# Patient Record
Sex: MALE | Race: WHITE
[De-identification: names, ages, dates, MRNs, and addresses within clinical notes are randomized per-mention and may not be internally consistent; named-entity substitution may affect disease eponyms.]

---

## 2017-07-24 NOTE — PREOPHP
DATE OF ADMISSION:  2017

 

 

 

The patient is to have surgey with Dr. Fisher at [facility] on 2017.

 

 

 

REASON FOR CONSULTATION:  Consultation requested by Dr. Aron Fisher for medical evaluation and clearance of a 62-year-old

gentleman about to undergo surgery.

 

 

Thank you Dr. Fisher for allowing us to participate in care of

this patient.

 

 

 

HISTORY OF PRESENT ILLNESS:  Redd Redmond is a 62-year-old

gentleman, issues with his left hip had repair of a torn labrum

arthroscopically done in 2016, which helped for a while but has

been bothering him ever since, and currently is being admitted

for a total hip replacement on the left side.

 

 

 

PAST SURGICAL HISTORY:  Has included appendectomy, the above-

mentioned arthroscopic surgery, tonsil and adenoid surgery, a

skin graft, a lymph node biopsy, skin cancers removed, as well as

a varicocele surgery.  He has fractured his right hand.

 

 

 

PRIOR HOSPITALIZATIONS:  The patient has had no medical

hospitalizations.

 

 

 

MEDICATIONS:  Include the following:  Prilosec, Lodine, Flexeril,

and periodic pain pills.

 

 

 

ALLERGIES:  HE IS ALLERGIC OR SENSITIVE TO MORPHINE.

 

 

 

SOCIAL HISTORY:  The patient is , has 3 daughters and 2

grandchildren.  He does not smoke.  He drinks alcohol socially.

He does drink coffee.  He has no difficulty sleeping at night.

Currently he is not working.

 

 

 

FAMILY HISTORY:  Father is alive at 86.  He has heart issues and

failure.  Mother  at age 67 of breast cancer and its

complications, she also had some heart issues.  Two siblings are

in good health and alive and well.  There is a family history of

diabetes, heart cancer, hypertension and stroke.  No thyroid

issues to his knowledge.

 

 

 

REVIEW OF SYSTEMS:

 

HEENT:  Has periodic tension headaches.

 

CARDIORESPIRATORY:  Denies any significant chest pain; however,

does get some dyspepsia.  No palpitations or orthopnea.

 

GASTROINTESTINAL:  No melena or hematemesis; however, signs and

symptoms of hyperacidity.

 

GENITOURINARY:  No urgency or frequency.

 

MUSCULOSKELETAL:  Positive for left hip pain.

 

NEUROPSYCHIATRIC:  Unremarkable.

 

GENERAL HEALTH:  As above.

 

 

 

PHYSICAL EXAMINATION:

 

VITAL SIGNS:  Blood pressure 120/80, pulse 86 and regular,

respirations 17, temperature 97.9, height 5 feet 6 inches, weight

174 pounds.

 

GENERAL APPEARANCE:  The patient was noted to be a well-developed

and well-nourished male, alert and cooperative in no apparent

acute distress, oriented to time, place and person.

 

HEENT:  Head was atraumatic.  The eyes, pupils were equal and

reactive to light and accommodation.  Fundi were benign.

Tympanic membranes were unremarkable.  Nose was negative.  Mouth

was unremarkable.  Fair oral hygiene was present.

 

NECK:  Was supple without any rigidity.  Trachea was midline.

Thyroid was unremarkable.  Neck veins were flat.  Carotid pulses

were equal.  No bruits were heard.

 

BACK:  Back exam was unremarkable.

 

CARDIAC:  Chest was symmetrical.

 

BREAST:  Breast and axillary exam did not reveal any masses.

 

LUNGS:  Clear to percussion and auscultation.

 

HEART:  Examination of the heart PMI was at the fifth intercostal

space at the midclavicular line.  A regular sinus rhythm was

noted.  No significant murmurs, rubs or gallops being elicited.

 

ABDOMEN:  Soft.  Good bowel sounds were noted.  No significant

organomegaly, masses or tenderness.  Scar from prior surgery was

noted.

 

GENITALIA:  Normal male external genitalia.

 

RECTAL:  Rectal and prostatic exam per PCP.

 

EXTREMITIES:  Did not reveal any clubbing, edema or cyanosis.

Peripheral pulses were physiologic.

 

SKIN:  Moist and warm without any eruptions.  No gross

lymphadenopathy was noted.

 

NEUROLOGIC:  Grossly intact.

 

 

 

DIAGNOSTICS:  Review of laboratory and other data revealed the

following:  The patient's chemistry panel including electrolytes,

BUN and creatinine were normal.  Liver function tests were

basically normal.  Random glucose was 126.  CBC, UA, PT and PTT

were normal.  The patient's EKG was normal as was his chest x-

ray, and bladder residual was around 50 mL post void.

 

 

 

IMPRESSION:

 

1.   Degenerative joint disease left hip.

2.   Gastroesophageal reflux disease.

3.   History of skin cancers.

4.   Stable health.

 

 

DISCUSSION:  Dr. Fisher, I see no contraindications to the

patient undergoing current proposed surgery under desired form of

anesthesia and feel he is a suitable candidate at this particular

point in time, and I will be more than happy to follow him with

you during his stay at [facility].

 

 

Thank you again Dr. Fisher for allowing us to participate in

care of this patient.

 

 

 

 

 

 

 

Dictated By:  Morgan Rueda MD

 

 

 

/arianna/tony

 

Job#:  [id number]/Document#:  [id number]

 

D:  2017 14:14

 

T:  2017 01:36

## 2017-07-27 NOTE — OPR
Date/Time of Note


Date/Time of Note


DATE: 7/27/17 


TIME: 09:12





Operative Report


Procedure Date:  Jul 27, 2017


Preoperative Diagnosis


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


Postoperative Diagnosis


Left hip status post hip arthroscopy with posttraumatic arthritis


Operation Performed


Left total hip arthroplasty after hip arthroscopy


Surgeon:  LAMBERTO GALLOWAY MD


Assistant:  SHANNON SOLANO MD


Anesthesia:  general


Estimated Blood Loss:  200 - 250 ml's


Pt Condition Post Procedure:  stable


Disposition:  PACU


Procedure Description


ASSISTANT SURGEON: Shannon Solano MD was asked to be present at my request as a 

result of the complexity associated with this procedure including positioning 

of the extremity, positioning of the instrumentation and protection of the 

neurovascular structures.  In my opinion, the assistance offered by a surgical 

scrub tech is insufficient and Dr. Solano should be compensated for his time.





PROCEDURE IN DETAIL: Following the administration of general endotracheal 

anesthesia supplemented with a spinal anesthetic, the patient was placed in the 

supine position.  The bilateral lower extremities were then prepped and draped 

in the usual sterile fashion.  A  radiograph was obtained for preliminary 

limb length and femoral size as well as acetabular size.





A lateral incision was then made exposing the tensor fascia the fascia was 

incised the tensor was retracted laterally and the vessels were cauterized.  

Significant scar tissue was encountered over the anterior capsule secondary to 

the prior hip arthroscopic procedure.  A capsulectomy was then performed and 

the femoral head was then evaluated severe arthritic changes were noted.





A femoral head cut was then made in the appropriate degree of version and 

inclination.





The acetabulum was then exposed capsulectomy and labral ectomy were completed.  

The central portion was then entered and serially reamed up to the 51 mm size.  

A Depuy [place] cup which is 52 mm in size with a standard liner was then fit 

into position with solid fixation.  A 30 mm screw was used for additional 

fixation.





Attention was then directed to the femur, the femur was exposed and prepared.  

The canal was entered and serially reamed up to the 11 mm size.  A 11 mm Depuy 

[place] stem was then inserted with solid fixation.  A standard femoral head 

which was ceramic was then inserted.





The leg was taken through full range of motion with no evident instability.  In 

addition, radiographs revealed excellent position with reproduction of the limb 

lengths within a millimeter.





The wound was irrigated thoroughly.  The wound was then closed in layers and a 

Prenio for the final cover.  This was watertight.  Estimated blood loss was 

procedure was 250 cc.  Postoperative radiographs will be obtained in the 

recovery room.











LAMBERTO GALLOWAY MD Jul 27, 2017 09:14

## 2017-07-27 NOTE — RADRPT
PROCEDURE:   XR Pelvis. 

 

CLINICAL INDICATION:  Postop

 

TECHNIQUE:   Single AP view of the pelvis. 

 

COMPARISON:   No prior studies are available for comparison. 

 

FINDINGS:

 

The patient is status post total left hip arthroplasty.  Alignment is anatomic.  Hardware appears in
tact. The remainder of the bones of the pelvis are intact.  There is subcutaneous gas overlying the 
left hip consistent with recent arthroplasty

 

IMPRESSION:

 

1.  Status post total left hip arthroplasty in anatomic alignment.

 

Of a text Medical

_____________________________________________ 

.Jose Alfredo Hernandez MD, MD           Date    Time 

Electronically viewed and signed by .Jose Alfredo Hernandez MD, MD on 07/27/2017 10:19 

 

D:  07/27/2017 10:19  T:  07/27/2017 10:19

.B/

## 2017-07-27 NOTE — HPN
Date/Time of Note


Date/Time of Note


DATE: 7/27/17 


TIME: 06:46





Interval H&P Admission Note


Pt. seen H&P reviewed:  No system changes











LAMBERTO GALLOWAY MD Jul 27, 2017 06:46

## 2017-07-27 NOTE — CONS
Date/Time of Note


Date/Time of Note


DATE: 7/27/17 


TIME: 12:49





Consult Date/Type/Reason


Admit Date/Time


Jul 27, 2017 at 05:20


Initial Consult Date


07/21/2017


Type of Consultation:  internal medicine


Reason for Consultation


preoperative medical evaluation and clearance


Ordering Provider:  LAMBERTO GALLOWAY MD





Subjective


seen in recovery awake recognizes me  no particular complaints





Objective





 Vital Signs








  Date Time  Temp Pulse Resp B/P Pulse Ox O2 Delivery O2 Flow Rate FiO2


 


7/27/17 11:22 97.7 47 20 135/61 99   


 


7/27/17 10:51      Nasal Cannula 2.0 








Exam


heent-negative


lungs clear 


heart6  rsr





Results/Medications


Result Diagram:  


7/27/17 1011





Results 24 hrs





Laboratory Tests








Test


  7/27/17


10:11


 


White Blood Count 12.5  H


 


Red Blood Count 3.54  L


 


Hemoglobin 11.6  L


 


Hematocrit 33.5  L


 


Mean Corpuscular Volume 94.6  


 


Mean Corpuscular Hemoglobin 32.8  


 


Mean Corpuscular Hemoglobin


Concent 34.6  


 


 


Red Cell Distribution Width 12.1  


 


Platelet Count 261  


 


Mean Platelet Volume 9.3  


 


Neutrophils % 89.1  H


 


Lymphocytes % 6.7  L


 


Monocytes % 2.8  


 


Eosinophils % 0.4  


 


Basophils % 0.3  


 


Nucleated Red Blood Cells % 0.0  


 


Neutrophils # 11.1  H


 


Lymphocytes # 0.8  


 


Monocytes # 0.4  


 


Eosinophils # 0.1  


 


Basophils # 0.0  


 


Nucleated Red Blood Cells # 0.0  








Medications





 Current Medications


Lactated Ringer's (Lr) 1,000 ml @  20 mls/hr Q24H IV*  Last administered on 7/27 /17at 06:14; Admin Dose 20 MLS/HR;  Start 7/27/17 at 06:00;  Stop 7/29/17 at 07:

59


Hydromorphone HCl (Dilaudid) 2 mg Q6H  PRN PO PAIN;  Start 7/27/17 at 09:30


Pantoprazole (Protonix Tab) 40 mg DAILY@06 PO ;  Start 7/28/17 at 06:00


Senna/Docusate Sodium (Senokot-S) 1 tab BID PO ;  Start 7/27/17 at 21:00


Simethicone (Mylicon) 80 mg TID  PRN PO DISTENSION/GAS/BLOATING;  Start 7/27/17 

at 09:30


Magnesium Hydroxide (Milk Of Mag) 30 ml BID  PRN PO CONSTIPATION;  Start 7/27/ 17 at 09:30


Acetaminophen (Tylenol Tab) 1,000 mg Q4H  PRN PO TEMP GREATER THAN 100.4F;  

Start 7/27/17 at 09:30


Dexamethasone (Decadron) 2 mg Q6 PO ;  Start 7/27/17 at 12:00;  Stop 7/28/17 at 

06:01


Gabapentin (Neurontin) 300 mg HS PO ;  Start 7/27/17 at 21:00


Oxycodone/ Acetaminophen (Percocet (5/ 325)) 1 tab Q4H  PRN PO PAIN LEVEL 1-5;  

Start 7/27/17 at 09:30


Oxycodone/ Acetaminophen (Percocet (5/ 325)) 2 tab Q4H  PRN PO PAIN LEVEL 6-10;

  Start 7/27/17 at 09:30


Ketorolac Tromethamine (Toradol) 15 mg Q6H  PRN IV PAIN;  Start 7/27/17 at 09:30

;  Stop 7/30/17 at 09:29


Ondansetron HCl (Zofran Inj) 4 mg Q6H  PRN IV NAUSEA AND/OR VOMITING Last 

administered on 7/27/17at 12:00; Admin Dose 4 MG;  Start 7/27/17 at 09:30


Diphenhydramine HCl (Benadryl) 25 mg Q6H  PRN IV PRURITUS;  Start 7/27/17 at 09:

30


Aspirin 81 mg 81 mg DAILY PO ;  Start 7/28/17 at 09:00


Lactated Ringer's (Lr) 1,000 ml @  100 mls/hr Q10H IV  Last administered on 7/27 /17at 10:58; Admin Dose 100 MLS/HR;  Start 7/27/17 at 09:06


Hydromorphone HCl (Dilaudid) 0.5 mg Q6H  PRN IV PAIN;  Start 7/27/17 at 09:30


Hydromorphone HCl (Dilaudid) 1 mg Q6H  PRN IV PAIN;  Start 7/27/17 at 09:30


Hydromorphone HCl 0.5 mg 0.5 mg Q4H  PRN IV PAIN;  Start 7/27/17 at 09:30


Cefazolin Sodium (Ancef 1 Gm/50 ml (Pmx)) 50 ml @  100 mls/hr Q8H IVPB  Last 

administered on 7/27/17at 10:03; Admin Dose 100 MLS/HR;  Start 7/27/17 at 10:00

;  Stop 7/28/17 at 02:29





Assessment/Plan


Chief Complaint/Hosp Course


patient post op total hip replacement left hip.medically stable post op


Problems:  


Additional Assessment/Plan


will follow with you---thank you  MARTINE Harmon MD Jul 27, 2017 12:56

## 2017-07-27 NOTE — RADRPT
PROCEDURE:   Fluoroscopic guidance with radiographic images during total left hip arthroplasty. 

 

CLINICAL INDICATION:   Left hip arthroplasty

 

TECHNIQUE:  12 images were obtained during left hip arthroplasty. 

 

COMPARISON:   None available 

 

FINDINGS:

 

58 seconds of fluoroscopy time was utilized during left hip arthroplasty.  12 radiographs were obtai
steve during the procedure in progress for guidance.  Multiple images show progressive left hip arthro
plasty.  Procedure was performed by Dr. Fisher. 

 

IMPRESSION:

 

1.  Fluoroscopic guidance with x-ray images obtained for guidance during left hip arthroplasty.

 

RPTAT: KK

_____________________________________________ 

.Jose Alfredo Hernadnez MD, MD           Date    Time 

Electronically viewed and signed by .Jose Alfredo Hernandez MD, MD on 07/27/2017 10:21 

 

D:  07/27/2017 10:21  T:  07/27/2017 10:21

.B/

## 2017-07-27 NOTE — PDOCDIS
Discharge Instructions


DIAGNOSIS


Discharge Diagnosis


Posttraumatic arthritis left hip





CONDITION


Patient Condition:  Good





HOME CARE INSTRUCTIONS:


Diet Instructions:  Regular





ACTIVITY:








Activity Restrictions:   Slowly Increase Activity





 Keep Limb Elevated





Bathing Restrictions:  Shower





FOLLOW UP/APPOINTMENTS


Follow-up Plan


2 weeks





SCHOOL/WORK RELEASE


May return to School/Work with:  With Restrictions


School/Work Release Comment:  No hip extension for 6 weeks











LAMBERTO GALLOWAY MD Jul 27, 2017 09:15

## 2017-07-28 NOTE — PN
Date/Time of Note


Date/Time of Note


DATE: 7/28/17 


TIME: 06:53





24 hour Interval Summary


Redd is awake and alert with no complaints this morning.  His nausea has 

resolved.





Physical Exam


Physical examination reveals that his left lower extremity is neurologically 

intact.  He has no signs of DVT.  His wound is clean and dry.  His Ospina has 

been removed.


 Vital Signs








  Date Time  Temp Pulse Resp B/P Pulse Ox O2 Delivery O2 Flow Rate FiO2


 


7/28/17 05:20 98.3 67 18 121/62 98 Nasal Cannula 2.0 














 Intake and Output   


 


 7/27/17 7/27/17 7/28/17





 15:00 23:00 07:00


 


Intake Total 110 ml 1350 ml 1250 ml


 


Output Total 300 ml 600 ml 780 ml


 


Balance -190 ml 750 ml 470 ml











VTE Prophylaxis


VTE Prophylaxis Intervention:  anti-embolic stocking





Lines/Catheters


IV Catheter Type:  Saline Lock


Ospina in Place:  No





Results


Result Diagram:  


7/28/17 0442





Results 24hrs





Laboratory Tests








Test


  7/27/17


10:11 7/28/17


04:42


 


White Blood Count 12.5  H 10.0  


 


Red Blood Count 3.54  L 3.14  L


 


Hemoglobin 11.6  L 10.0  L


 


Hematocrit 33.5  L 30.1  L


 


Mean Corpuscular Volume 94.6   95.9  


 


Mean Corpuscular Hemoglobin 32.8   31.8  


 


Mean Corpuscular Hemoglobin


Concent 34.6  


  33.2  


 


 


Red Cell Distribution Width 12.1   12.3  


 


Platelet Count 261   220  


 


Mean Platelet Volume 9.3   10.0  


 


Neutrophils % 89.1  H 87.1  H


 


Lymphocytes % 6.7  L 5.9  L


 


Monocytes % 2.8   6.2  


 


Eosinophils % 0.4   0.2  


 


Basophils % 0.3   0.2  


 


Nucleated Red Blood Cells % 0.0   0.0  


 


Neutrophils # 11.1  H 8.7  H


 


Lymphocytes # 0.8   0.6  L


 


Monocytes # 0.4   0.6  


 


Eosinophils # 0.1   0.0  


 


Basophils # 0.0   0.0  


 


Nucleated Red Blood Cells # 0.0   0.0  











Assessment/Plan


Chief Complaint/Hosp Course


patient post op total hip replacement left hip.medically stable post op


Problems:  


Assessment/Plan


Assessment: He will begin physical therapy and be discharged following 

clearance by that department.  He will follow-up in the office in 2 weeks





Medications


Medications


Home Meds


Reported Medications


Etodolac (Lodine) 500 Mg Tablet, 500 MG PO BID Y for ASPEN, TAB


   7/27/17


Omeprazole* (Omeprazole*) 40 Mg Capsule.dr, 40 MG PO DAILY, #30 CAP


   7/27/17


Hydromorphone Hcl* (Dilaudid*) 2 Mg Tablet, 2 MG PO Q6H Y for PAIN, TAB


   7/27/17











LAMBERTO GALLOWAY MD Jul 28, 2017 06:54

## 2017-07-28 NOTE — DS
Date/Time of Note


Date/Time of Note


DATE: 7/28/17 


TIME: 06:54





Discharge Summary


Admission/Discharge Info


Admit Date/Time


Jul 27, 2017 at 05:20


Discharge Date/Time


July 28, 2017 following clearance by physical therapy


Discharge Diagnosis


Posttraumatic arthritis left hip


Patient Condition:  Good


Procedures


Left total hip arthroplasty


Hx of Present Illness


Pain and stiffness following work injury.


Hospital Course


patient post op total hip replacement left hip.medically stable post op he 

underwent an uncomplicated procedure.  On postoperative day #1 he was stable 

and discharged.


Home Meds


Reported Medications


Etodolac (Lodine) 500 Mg Tablet, 500 MG PO BID Y for ASPEN, TAB


   7/27/17


Omeprazole* (Omeprazole*) 40 Mg Capsule.dr, 40 MG PO DAILY, #30 CAP


   7/27/17


Hydromorphone Hcl* (Dilaudid*) 2 Mg Tablet, 2 MG PO Q6H Y for PAIN, TAB


   7/27/17


Primary Care Provider


Not On Staff Doctor


Pending Labs





Laboratory Tests








Test


  7/27/17


10:11 7/28/17


04:42


 


White Blood Count


  12.510^3/ul


(4.8-10.8) 10.010^3/ul


(4.8-10.8)


 


Red Blood Count


  3.5410^6/ul


(4.70-6.10) 3.1410^6/ul


(4.70-6.10)


 


Hemoglobin


  11.6g/dl


(14.0-18.0) 10.0g/dl


(14.0-18.0)


 


Hematocrit


  33.5%


(42.0-52.0) 30.1%


(42.0-52.0)


 


Mean Corpuscular Volume


  94.6fl


(82.0-101.0) 95.9fl


(82.0-101.0)


 


Mean Corpuscular Hemoglobin


  32.8pg


(29.0-33.0) 31.8pg


(29.0-33.0)


 


Mean Corpuscular Hemoglobin


Concent 34.6g/dl


(32.0-37.0) 33.2g/dl


(32.0-37.0)


 


Red Cell Distribution Width


  12.1%


(11.5-14.5) 12.3%


(11.5-14.5)


 


Platelet Count


  78577^3/UL


(140-415) 83807^3/UL


(140-415)


 


Mean Platelet Volume


  9.3fl


(7.4-10.4) 10.0fl


(7.4-10.4)


 


Neutrophils %


  89.1%


(39.0-77.0) 87.1%


(39.0-77.0)


 


Lymphocytes %


  6.7%


(15.0-51.0) 5.9%


(15.0-51.0)


 


Monocytes %


  2.8%


(0.0-11.0) 6.2%


(0.0-11.0)


 


Eosinophils % 0.4% (0.0-7.0)  0.2% (0.0-7.0) 


 


Basophils % 0.3% (0.0-2.0)  0.2% (0.0-2.0) 


 


Nucleated Red Blood Cells %


  0.0/100WBC


(0.0-0.0) 0.0/100WBC


(0.0-0.0)


 


Neutrophils #


  11.110^3/ul


(1.6-7.5) 8.710^3/ul


(1.6-7.5)


 


Lymphocytes #


  0.810^3/ul


(0.8-2.9) 0.610^3/ul


(0.8-2.9)


 


Monocytes #


  0.410^3/ul


(0.3-0.9) 0.610^3/ul


(0.3-0.9)


 


Eosinophils #


  0.110^3/ul


(0.0-0.5) 0.010^3/ul


(0.0-0.5)


 


Basophils #


  0.010^3/ul


(0.0-0.1) 0.010^3/ul


(0.0-0.1)


 


Nucleated Red Blood Cells #


  0.010^3/ul


(0.0-0.0) 0.010^3/ul


(0.0-0.0)

















LAMBERTO GALLOWAY MD Jul 28, 2017 06:55

## 2017-07-28 NOTE — CONS
Date/Time of Note


Date/Time of Note


DATE: 7/28/17 


TIME: 07:56





Consult Date/Type/Reason


Admit Date/Time


Jul 27, 2017 at 05:20


Initial Consult Date


07/21/2017


Type of Consultation:  internal medicine


Reason for Consultation


medical f/u


Ordering Provider:  LAMBERTO GALLOWAY MD





Subjective


alert doing relatively well no complaits other than a little pian level 2or 3





Objective





 Vital Signs








  Date Time  Temp Pulse Resp B/P Pulse Ox O2 Delivery O2 Flow Rate FiO2


 


7/28/17 07:28 98.8 49 18 109/56 98   


 


7/28/17 05:20      Nasal Cannula 2.0 














 Intake and Output   


 


 7/27/17 7/27/17 7/28/17





 15:00 23:00 07:00


 


Intake Total 110 ml 1350 ml 1250 ml


 


Output Total 300 ml 600 ml 780 ml


 


Balance -190 ml 750 ml 470 ml








Exam


heent neg.


lungs clear


heart rsr


abd. solt





Results/Medications


Result Diagram:  


7/28/17 0442





Results 24 hrs





Laboratory Tests








Test


  7/27/17


10:11 7/28/17


04:42


 


White Blood Count 12.5  H 10.0  


 


Red Blood Count 3.54  L 3.14  L


 


Hemoglobin 11.6  L 10.0  L


 


Hematocrit 33.5  L 30.1  L


 


Mean Corpuscular Volume 94.6   95.9  


 


Mean Corpuscular Hemoglobin 32.8   31.8  


 


Mean Corpuscular Hemoglobin


Concent 34.6  


  33.2  


 


 


Red Cell Distribution Width 12.1   12.3  


 


Platelet Count 261   220  


 


Mean Platelet Volume 9.3   10.0  


 


Neutrophils % 89.1  H 87.1  H


 


Lymphocytes % 6.7  L 5.9  L


 


Monocytes % 2.8   6.2  


 


Eosinophils % 0.4   0.2  


 


Basophils % 0.3   0.2  


 


Nucleated Red Blood Cells % 0.0   0.0  


 


Neutrophils # 11.1  H 8.7  H


 


Lymphocytes # 0.8   0.6  L


 


Monocytes # 0.4   0.6  


 


Eosinophils # 0.1   0.0  


 


Basophils # 0.0   0.0  


 


Nucleated Red Blood Cells # 0.0   0.0  








Medications





 Current Medications


Lactated Ringer's (Lr) 1,000 ml @  20 mls/hr Q24H IV*  Last administered on 7/27 /17at 06:14; Admin Dose 20 MLS/HR;  Start 7/27/17 at 06:00;  Stop 7/29/17 at 07:

59


Hydromorphone HCl (Dilaudid) 2 mg Q6H  PRN PO PAIN;  Start 7/27/17 at 09:30


Pantoprazole (Protonix Tab) 40 mg DAILY@06 PO  Last administered on 7/28/17at 06

:05; Admin Dose 40 MG;  Start 7/28/17 at 06:00


Senna/Docusate Sodium (Senokot-S) 1 tab BID PO  Last administered on 7/27/17at 

20:26; Admin Dose 1 TAB;  Start 7/27/17 at 21:00


Simethicone (Mylicon) 80 mg TID  PRN PO DISTENSION/GAS/BLOATING;  Start 7/27/17 

at 09:30


Magnesium Hydroxide (Milk Of Mag) 30 ml BID  PRN PO CONSTIPATION;  Start 7/27/ 17 at 09:30


Acetaminophen (Tylenol Tab) 1,000 mg Q4H  PRN PO TEMP GREATER THAN 100.4F;  

Start 7/27/17 at 09:30


Gabapentin (Neurontin) 300 mg HS PO  Last administered on 7/27/17at 20:25; 

Admin Dose 300 MG;  Start 7/27/17 at 21:00


Oxycodone/ Acetaminophen (Percocet (5/ 325)) 1 tab Q4H  PRN PO PAIN LEVEL 1-5;  

Start 7/27/17 at 09:30


Oxycodone/ Acetaminophen (Percocet (5/ 325)) 2 tab Q4H  PRN PO PAIN LEVEL 6-10;

  Start 7/27/17 at 09:30


Ketorolac Tromethamine (Toradol) 15 mg Q6H  PRN IV PAIN Last administered on 7/ 28/17at 02:42; Admin Dose 15 MG;  Start 7/27/17 at 09:30;  Stop 7/30/17 at 09:29


Ondansetron HCl (Zofran Inj) 4 mg Q6H  PRN IV NAUSEA AND/OR VOMITING Last 

administered on 7/28/17at 00:09; Admin Dose 4 MG;  Start 7/27/17 at 09:30


Diphenhydramine HCl (Benadryl) 25 mg Q6H  PRN IV PRURITUS;  Start 7/27/17 at 09:

30


Aspirin 81 mg 81 mg DAILY PO ;  Start 7/28/17 at 09:00


Lactated Ringer's (Lr) 1,000 ml @  100 mls/hr Q10H IV  Last administered on 7/27 /17at 21:30; Admin Dose 100 MLS/HR;  Start 7/27/17 at 09:06


Hydromorphone HCl (Dilaudid) 0.5 mg Q6H  PRN IV PAIN;  Start 7/27/17 at 09:30


Hydromorphone HCl (Dilaudid) 1 mg Q6H  PRN IV PAIN;  Start 7/27/17 at 09:30


Hydromorphone HCl (Dilaudid) 0.5 mg Q4H  PRN IV PAIN;  Start 7/27/17 at 09:30





Assessment/Plan


Chief Complaint/Hosp Course


patient post op total hip replacement left hip.medically stable post op


Problems:  


Additional Assessment/Plan


suggest iron and vits re anemia


plan per  medically stable  thank you   MARTINE Harmon MD Jul 28, 2017 08:00

## 2017-07-28 NOTE — CONS
Date/Time of Note


Date/Time of Note


DATE: 7/28/17 


TIME: 14:30





Consultation Date/Type/Reason


Admit Date/Time


Jul 27, 2017 at 05:20


Initial Consult Date


7/28/17


Type of Consultation:  Anesthesiology


Reason for Consultation


Follow  up


Referring Provider:  LAMBERTO GALLOWAY MD





24 HR Interval Summary


Free Text/Dictation


Pt seen and examined at bedside is POD#1 s/p Left Total Hip Replacement. Pt 

received spinal Duramorph and states he only has minimal pain. Lower 

extremities have no numbness. No N/V/D/C/HA. Will continue to follow.


Constitutional:  improved, no complaints





Exam/Review of Systems


Vital Signs


Vitals





 Vital Signs








  Date Time  Temp Pulse Resp B/P Pulse Ox O2 Delivery O2 Flow Rate FiO2


 


7/28/17 07:28 98.8 49 18 109/56 98   


 


7/28/17 05:20      Nasal Cannula 2.0 














 Intake and Output   


 


 7/27/17 7/27/17 7/28/17





 15:00 23:00 07:00


 


Intake Total 110 ml 1350 ml 1250 ml


 


Output Total 300 ml 600 ml 780 ml


 


Balance -190 ml 750 ml 470 ml











Results


Result Diagram:  


7/28/17 0442





Results 24 hrs





Laboratory Tests








Test


  7/28/17


04:42


 


White Blood Count 10.0  


 


Red Blood Count 3.14  L


 


Hemoglobin 10.0  L


 


Hematocrit 30.1  L


 


Mean Corpuscular Volume 95.9  


 


Mean Corpuscular Hemoglobin 31.8  


 


Mean Corpuscular Hemoglobin


Concent 33.2  


 


 


Red Cell Distribution Width 12.3  


 


Platelet Count 220  


 


Mean Platelet Volume 10.0  


 


Neutrophils % 87.1  H


 


Lymphocytes % 5.9  L


 


Monocytes % 6.2  


 


Eosinophils % 0.2  


 


Basophils % 0.2  


 


Nucleated Red Blood Cells % 0.0  


 


Neutrophils # 8.7  H


 


Lymphocytes # 0.6  L


 


Monocytes # 0.6  


 


Eosinophils # 0.0  


 


Basophils # 0.0  


 


Nucleated Red Blood Cells # 0.0  

















GONZALO LERNER Jul 28, 2017 14:33

## 2018-11-01 ENCOUNTER — HOSPITAL ENCOUNTER (INPATIENT)
Dept: HOSPITAL 91 - REC | Age: 63
LOS: 1 days | Discharge: HOME | DRG: 483 | End: 2018-11-02
Payer: COMMERCIAL

## 2018-11-01 ENCOUNTER — HOSPITAL ENCOUNTER (INPATIENT)
Age: 63
LOS: 1 days | Discharge: HOME | DRG: 483 | End: 2018-11-02

## 2018-11-01 DIAGNOSIS — K21.9: ICD-10-CM

## 2018-11-01 DIAGNOSIS — Z98.52: ICD-10-CM

## 2018-11-01 DIAGNOSIS — T14.90XS: ICD-10-CM
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
## 2018-11-01 DIAGNOSIS — Z96.642: ICD-10-CM

## 2018-11-01 DIAGNOSIS — M19.112: Primary | ICD-10-CM

## 2018-11-01 DIAGNOSIS — G89.4: ICD-10-CM

## 2018-11-01 DIAGNOSIS — Z85.828: ICD-10-CM

## 2018-11-01 PROCEDURE — 88304 TISSUE EXAM BY PATHOLOGIST: CPT

## 2018-11-01 PROCEDURE — 86999 UNLISTED TRANSFUSION MED PX: CPT

## 2018-11-01 PROCEDURE — 73030 X-RAY EXAM OF SHOULDER: CPT

## 2018-11-01 PROCEDURE — 88311 DECALCIFY TISSUE: CPT

## 2018-11-01 PROCEDURE — 0RRK0JZ REPLACEMENT OF LEFT SHOULDER JOINT WITH SYNTHETIC SUBSTITUTE, OPEN APPROACH: ICD-10-PCS

## 2018-11-01 PROCEDURE — 97166 OT EVAL MOD COMPLEX 45 MIN: CPT

## 2018-11-01 RX ADMIN — GABAPENTIN 1 MG: 300 CAPSULE ORAL at 20:31

## 2018-11-01 RX ADMIN — CEFAZOLIN SODIUM 1 MLS/HR: 2 SOLUTION INTRAVENOUS at 06:00

## 2018-11-01 RX ADMIN — ONDANSETRON HYDROCHLORIDE 1 MG: 2 INJECTION, SOLUTION INTRAMUSCULAR; INTRAVENOUS at 13:40

## 2018-11-01 RX ADMIN — NABUMETONE 1 MG: 500 TABLET, FILM COATED ORAL at 23:27

## 2018-11-01 RX ADMIN — GABAPENTIN 1 MG: 300 CAPSULE ORAL at 06:19

## 2018-11-01 RX ADMIN — DOCUSATE SODIUM AND SENNOSIDES 1 TAB: 8.6; 5 TABLET, FILM COATED ORAL at 20:31

## 2018-11-01 RX ADMIN — PANTOPRAZOLE SODIUM 1 MG: 40 TABLET, DELAYED RELEASE ORAL at 10:00

## 2018-11-01 RX ADMIN — CEFAZOLIN 1 MLS/HR: 1 INJECTION, POWDER, FOR SOLUTION INTRAMUSCULAR; INTRAVENOUS at 10:39

## 2018-11-01 RX ADMIN — PYRIDOXINE HYDROCHLORIDE 1 MLS/HR: 100 INJECTION, SOLUTION INTRAMUSCULAR; INTRAVENOUS at 17:03

## 2018-11-01 RX ADMIN — NABUMETONE 1 MG: 500 TABLET, FILM COATED ORAL at 09:00

## 2018-11-01 RX ADMIN — CEFAZOLIN 1 MLS/HR: 1 INJECTION, POWDER, FOR SOLUTION INTRAMUSCULAR; INTRAVENOUS at 17:02

## 2018-11-01 RX ADMIN — BACITRACIN 1 ML: 50000 INJECTION, POWDER, FOR SOLUTION INTRAMUSCULAR at 07:28

## 2018-11-01 RX ADMIN — TRANEXAMIC ACID 1: 100 INJECTION, SOLUTION INTRAVENOUS at 07:15

## 2018-11-01 RX ADMIN — BUPIVACAINE HYDROCHLORIDE 1 ML: 5 INJECTION, SOLUTION EPIDURAL; INTRACAUDAL; PERINEURAL at 07:32

## 2018-11-01 RX ADMIN — DOCUSATE SODIUM AND SENNOSIDES 1 TAB: 8.6; 5 TABLET, FILM COATED ORAL at 09:00

## 2018-11-01 RX ADMIN — TRANEXAMIC ACID 1 MLS/HR: 100 INJECTION, SOLUTION INTRAVENOUS at 10:04

## 2018-11-01 RX ADMIN — DONEPEZIL HYDROCHLORIDE 1 MG: 10 TABLET, FILM COATED ORAL at 09:00

## 2018-11-01 RX ADMIN — ONDANSETRON HYDROCHLORIDE 1 MG: 2 INJECTION, SOLUTION INTRAMUSCULAR; INTRAVENOUS at 18:58

## 2018-11-02 RX ADMIN — DONEPEZIL HYDROCHLORIDE 1 MG: 10 TABLET, FILM COATED ORAL at 08:54

## 2018-11-02 RX ADMIN — ASPIRIN 81 MG CHEWABLE TABLET 1 MG: 81 TABLET CHEWABLE at 08:54

## 2018-11-02 RX ADMIN — NABUMETONE 1 MG: 500 TABLET, FILM COATED ORAL at 08:57

## 2018-11-02 RX ADMIN — HYDROMORPHONE HYDROCHLORIDE 1 MG: 2 TABLET ORAL at 08:55

## 2018-11-02 RX ADMIN — PANTOPRAZOLE SODIUM 1 MG: 40 TABLET, DELAYED RELEASE ORAL at 06:14

## 2018-11-02 RX ADMIN — CEFAZOLIN 1 MLS/HR: 1 INJECTION, POWDER, FOR SOLUTION INTRAMUSCULAR; INTRAVENOUS at 00:53

## 2018-11-02 RX ADMIN — DOCUSATE SODIUM AND SENNOSIDES 1 TAB: 8.6; 5 TABLET, FILM COATED ORAL at 08:54

## 2019-01-29 ENCOUNTER — PREPPED CHART (OUTPATIENT)
Dept: URBAN - METROPOLITAN AREA CLINIC 47 | Facility: CLINIC | Age: 64
End: 2019-01-29

## 2022-08-15 ENCOUNTER — NEW PATIENT (OUTPATIENT)
Dept: URBAN - METROPOLITAN AREA CLINIC 37 | Facility: CLINIC | Age: 67
End: 2022-08-15

## 2022-08-15 DIAGNOSIS — H52.13: ICD-10-CM

## 2022-08-15 DIAGNOSIS — Z79.4: ICD-10-CM

## 2022-08-15 DIAGNOSIS — E11.3293: ICD-10-CM

## 2022-08-15 DIAGNOSIS — H25.13: ICD-10-CM

## 2022-08-15 PROCEDURE — 92004 COMPRE OPH EXAM NEW PT 1/>: CPT

## 2022-08-15 ASSESSMENT — KERATOMETRY
OD_AXISANGLE_DEGREES: 100
OS_AXISANGLE_DEGREES: 067
OS_K1POWER_DIOPTERS: 45.00
OS_K2POWER_DIOPTERS: 46.50
OD_K2POWER_DIOPTERS: 46.25
OS_AXISANGLE2_DEGREES: 157
OD_K1POWER_DIOPTERS: 45.75
OD_AXISANGLE2_DEGREES: 10

## 2022-08-15 ASSESSMENT — TONOMETRY
OS_IOP_MMHG: 12
OD_IOP_MMHG: 14

## 2022-08-15 ASSESSMENT — VISUAL ACUITY
OD_PH: 20/30
OS_CC: 20/30-1
OU_SC: 20/40
OD_CC: 20/40+2

## 2022-09-09 ENCOUNTER — CATARACT CONSULTATION (OUTPATIENT)
Dept: URBAN - METROPOLITAN AREA CLINIC 37 | Facility: CLINIC | Age: 67
End: 2022-09-09

## 2022-09-09 DIAGNOSIS — H25.13: ICD-10-CM

## 2022-09-09 PROCEDURE — 92014 COMPRE OPH EXAM EST PT 1/>: CPT

## 2022-09-09 ASSESSMENT — KERATOMETRY
OS_AXISANGLE2_DEGREES: 157
OS_K1POWER_DIOPTERS: 45.00
OD_K2POWER_DIOPTERS: 46.25
OS_AXISANGLE_DEGREES: 067
OD_AXISANGLE2_DEGREES: 10
OD_K1POWER_DIOPTERS: 45.75
OD_AXISANGLE_DEGREES: 100
OS_K2POWER_DIOPTERS: 46.50

## 2022-09-09 ASSESSMENT — VISUAL ACUITY
OD_SC: 20/40
OS_SC: 20/50+1
OS_PH: 20/40-1
OD_PH: 20/25-1
OU_SC: J2

## 2022-09-09 ASSESSMENT — TONOMETRY
OD_IOP_MMHG: 13
OS_IOP_MMHG: 14

## 2022-09-16 ENCOUNTER — DIAGNOSTICS ONLY (OUTPATIENT)
Dept: URBAN - METROPOLITAN AREA CLINIC 37 | Facility: CLINIC | Age: 67
End: 2022-09-16

## 2022-09-16 DIAGNOSIS — H25.13: ICD-10-CM

## 2022-09-16 PROCEDURE — 92136 OPHTHALMIC BIOMETRY: CPT

## 2022-09-16 ASSESSMENT — KERATOMETRY
OS_AXISANGLE_DEGREES: 067
OS_K1POWER_DIOPTERS: 45.00
OD_AXISANGLE2_DEGREES: 10
OD_K1POWER_DIOPTERS: 45.75
OD_K2POWER_DIOPTERS: 46.25
OD_AXISANGLE_DEGREES: 100
OS_AXISANGLE2_DEGREES: 157
OS_K2POWER_DIOPTERS: 46.50

## 2022-10-18 ENCOUNTER — SURGERY/PROCEDURE (OUTPATIENT)
Dept: SURGICAL CENTER 4 | Facility: SURGICAL CENTER | Age: 67
End: 2022-10-18

## 2022-10-18 DIAGNOSIS — H25.12: ICD-10-CM

## 2022-10-18 PROCEDURE — 66984 XCAPSL CTRC RMVL W/O ECP: CPT

## 2022-10-19 ENCOUNTER — 1 DAY POST-OP (OUTPATIENT)
Dept: URBAN - METROPOLITAN AREA CLINIC 37 | Facility: CLINIC | Age: 67
End: 2022-10-19

## 2022-10-19 DIAGNOSIS — Z96.1: ICD-10-CM

## 2022-10-19 PROCEDURE — 99024 POSTOP FOLLOW-UP VISIT: CPT

## 2022-10-19 ASSESSMENT — KERATOMETRY
OS_AXISANGLE2_DEGREES: 157
OS_K1POWER_DIOPTERS: 45.00
OD_AXISANGLE2_DEGREES: 10
OD_K1POWER_DIOPTERS: 45.75
OD_AXISANGLE_DEGREES: 100
OS_AXISANGLE_DEGREES: 067
OD_K2POWER_DIOPTERS: 46.25
OS_K2POWER_DIOPTERS: 46.50
OS_K2POWER_DIOPTERS: 46.50
OD_AXISANGLE2_DEGREES: 10
OD_K2POWER_DIOPTERS: 46.25
OD_AXISANGLE_DEGREES: 100
OS_AXISANGLE_DEGREES: 067
OD_K1POWER_DIOPTERS: 45.75
OS_AXISANGLE2_DEGREES: 157
OS_K1POWER_DIOPTERS: 45.00

## 2022-10-19 ASSESSMENT — VISUAL ACUITY
OU_SC: 20/50
OS_SC: 20/30

## 2022-10-25 ENCOUNTER — SURGERY/PROCEDURE (OUTPATIENT)
Dept: SURGICAL CENTER 4 | Facility: SURGICAL CENTER | Age: 67
End: 2022-10-25

## 2022-10-25 ENCOUNTER — DIAGNOSTICS ONLY (OUTPATIENT)
Dept: URBAN - METROPOLITAN AREA CLINIC 37 | Facility: CLINIC | Age: 67
End: 2022-10-25

## 2022-10-25 DIAGNOSIS — H25.811: ICD-10-CM

## 2022-10-25 DIAGNOSIS — H25.11: ICD-10-CM

## 2022-10-25 PROCEDURE — 92136 OPHTHALMIC BIOMETRY: CPT | Mod: 26,RT

## 2022-10-25 PROCEDURE — 66984 XCAPSL CTRC RMVL W/O ECP: CPT | Mod: 79,RT

## 2022-10-26 ENCOUNTER — 1 DAY POST-OP (OUTPATIENT)
Dept: URBAN - METROPOLITAN AREA CLINIC 37 | Facility: CLINIC | Age: 67
End: 2022-10-26

## 2022-10-26 DIAGNOSIS — Z96.1: ICD-10-CM

## 2022-10-26 PROCEDURE — 99024 POSTOP FOLLOW-UP VISIT: CPT

## 2022-10-26 ASSESSMENT — KERATOMETRY
OD_AXISANGLE_DEGREES: 100
OD_AXISANGLE2_DEGREES: 10
OS_K2POWER_DIOPTERS: 46.50
OS_K1POWER_DIOPTERS: 45.00
OD_K1POWER_DIOPTERS: 45.75
OS_AXISANGLE2_DEGREES: 157
OS_AXISANGLE_DEGREES: 067
OS_AXISANGLE2_DEGREES: 157
OD_K2POWER_DIOPTERS: 46.25
OS_AXISANGLE_DEGREES: 067
OD_K1POWER_DIOPTERS: 45.75
OD_AXISANGLE2_DEGREES: 10
OD_AXISANGLE_DEGREES: 100
OS_K2POWER_DIOPTERS: 46.50
OD_AXISANGLE2_DEGREES: 10
OD_AXISANGLE_DEGREES: 100
OS_AXISANGLE2_DEGREES: 157
OD_K2POWER_DIOPTERS: 46.25
OS_AXISANGLE_DEGREES: 067
OS_K1POWER_DIOPTERS: 45.00
OS_K1POWER_DIOPTERS: 45.00
OD_K1POWER_DIOPTERS: 45.75
OS_K2POWER_DIOPTERS: 46.50
OD_K2POWER_DIOPTERS: 46.25

## 2022-10-26 ASSESSMENT — TONOMETRY
OS_IOP_MMHG: 14.0
OD_IOP_MMHG: 17.0

## 2022-10-26 ASSESSMENT — VISUAL ACUITY
OD_SC: 20/40+1
OS_SC: 20/40

## 2022-11-09 ENCOUNTER — REFRACTION ONLY (OUTPATIENT)
Dept: URBAN - METROPOLITAN AREA CLINIC 37 | Facility: CLINIC | Age: 67
End: 2022-11-09

## 2022-11-09 DIAGNOSIS — Z96.1: ICD-10-CM

## 2022-11-09 DIAGNOSIS — H52.4: ICD-10-CM

## 2022-11-09 PROCEDURE — 99024 POSTOP FOLLOW-UP VISIT: CPT

## 2022-11-09 ASSESSMENT — TONOMETRY
OS_IOP_MMHG: 14.0
OD_IOP_MMHG: 18.0

## 2022-11-09 ASSESSMENT — KERATOMETRY
OS_AXISANGLE_DEGREES: 067
OS_K2POWER_DIOPTERS: 46.50
OS_AXISANGLE2_DEGREES: 157
OS_K1POWER_DIOPTERS: 45.00
OD_K2POWER_DIOPTERS: 46.25
OD_AXISANGLE_DEGREES: 100
OD_K1POWER_DIOPTERS: 45.75
OD_AXISANGLE2_DEGREES: 10

## 2022-11-09 ASSESSMENT — VISUAL ACUITY
OS_SC: 20/50
OS_SC: 20/25
OD_SC: 20/25
OD_SC: 20/50

## 2022-12-23 ENCOUNTER — FOLLOW UP (OUTPATIENT)
Dept: URBAN - METROPOLITAN AREA CLINIC 37 | Facility: CLINIC | Age: 67
End: 2022-12-23

## 2022-12-23 DIAGNOSIS — Z96.1: ICD-10-CM

## 2022-12-23 PROCEDURE — 99024 POSTOP FOLLOW-UP VISIT: CPT

## 2022-12-23 ASSESSMENT — VISUAL ACUITY
OD_CC: 20/30
OS_CC: 20/50+2
OS_CC: J2
OD_CC: J2
OU_CC: J1

## 2022-12-23 ASSESSMENT — KERATOMETRY
OS_K1POWER_DIOPTERS: 45.00
OS_K2POWER_DIOPTERS: 46.50
OS_AXISANGLE_DEGREES: 067
OS_AXISANGLE2_DEGREES: 157
OD_K1POWER_DIOPTERS: 45.75
OD_K2POWER_DIOPTERS: 46.25
OD_AXISANGLE2_DEGREES: 10
OD_AXISANGLE_DEGREES: 100

## 2022-12-23 ASSESSMENT — TONOMETRY
OS_IOP_MMHG: 18
OD_IOP_MMHG: 15

## (undated) RX ORDER — BROMFENAC 0.76 MG/ML: 1 SOLUTION/ DROPS OPHTHALMIC TWICE A DAY